# Patient Record
Sex: FEMALE | Race: WHITE | NOT HISPANIC OR LATINO | Employment: UNEMPLOYED | URBAN - METROPOLITAN AREA
[De-identification: names, ages, dates, MRNs, and addresses within clinical notes are randomized per-mention and may not be internally consistent; named-entity substitution may affect disease eponyms.]

---

## 2023-07-23 ENCOUNTER — APPOINTMENT (OUTPATIENT)
Dept: RADIOLOGY | Facility: IMAGING CENTER | Age: 48
End: 2023-07-23
Attending: PHYSICIAN ASSISTANT
Payer: MEDICAID

## 2023-07-23 ENCOUNTER — OFFICE VISIT (OUTPATIENT)
Dept: URGENT CARE | Facility: PHYSICIAN GROUP | Age: 48
End: 2023-07-23
Payer: MEDICAID

## 2023-07-23 VITALS
OXYGEN SATURATION: 96 % | HEART RATE: 68 BPM | WEIGHT: 253 LBS | BODY MASS INDEX: 38.34 KG/M2 | SYSTOLIC BLOOD PRESSURE: 112 MMHG | RESPIRATION RATE: 16 BRPM | TEMPERATURE: 97.7 F | HEIGHT: 68 IN | DIASTOLIC BLOOD PRESSURE: 80 MMHG

## 2023-07-23 DIAGNOSIS — R20.2 NUMBNESS AND TINGLING IN LEFT ARM: ICD-10-CM

## 2023-07-23 DIAGNOSIS — R20.0 NUMBNESS AND TINGLING IN LEFT ARM: ICD-10-CM

## 2023-07-23 DIAGNOSIS — M47.22 CERVICAL SPONDYLOSIS WITH RADICULOPATHY: ICD-10-CM

## 2023-07-23 PROCEDURE — 72040 X-RAY EXAM NECK SPINE 2-3 VW: CPT | Mod: TC,FY | Performed by: PHYSICIAN ASSISTANT

## 2023-07-23 PROCEDURE — 3074F SYST BP LT 130 MM HG: CPT | Performed by: PHYSICIAN ASSISTANT

## 2023-07-23 PROCEDURE — 3079F DIAST BP 80-89 MM HG: CPT | Performed by: PHYSICIAN ASSISTANT

## 2023-07-23 PROCEDURE — 99203 OFFICE O/P NEW LOW 30 MIN: CPT | Performed by: PHYSICIAN ASSISTANT

## 2023-07-23 RX ORDER — NICOTINE 21 MG/24HR
PATCH, TRANSDERMAL 24 HOURS TRANSDERMAL
COMMUNITY
Start: 2023-07-20 | End: 2024-03-11

## 2023-07-23 RX ORDER — HYDROXYZINE 50 MG/1
50 TABLET, FILM COATED ORAL 3 TIMES DAILY PRN
COMMUNITY
Start: 2023-07-19 | End: 2024-03-11

## 2023-07-23 RX ORDER — TRAZODONE HYDROCHLORIDE 100 MG/1
TABLET ORAL
COMMUNITY
Start: 2023-07-19

## 2023-07-23 RX ORDER — BUPROPION HYDROCHLORIDE 150 MG/1
TABLET, EXTENDED RELEASE ORAL
COMMUNITY
Start: 2023-07-19 | End: 2024-03-11

## 2023-07-23 RX ORDER — METHYLPREDNISOLONE 4 MG/1
4 TABLET ORAL DAILY
Qty: 21 TABLET | Refills: 0 | Status: SHIPPED | OUTPATIENT
Start: 2023-07-23 | End: 2024-03-11

## 2023-07-23 ASSESSMENT — ENCOUNTER SYMPTOMS
EYES NEGATIVE: 1
MUSCULOSKELETAL NEGATIVE: 1

## 2023-07-23 NOTE — PROGRESS NOTES
"  Subjective:     Sandra Garcia  is a 48 y.o. female who presents for Shoulder Pain ((L) x 1 month radiating down to L finger tips.  )       She presents today with left arm numbness and tingling that is been ongoing intermittently over the past month.  No trauma or injury to the shoulder noted.  Shoulder strength and range of motion does remain intact.  No cervical spine pain.  No loss of cervical spine movement.  No cervical spine injury.  Has been using over-the-counter medications for symptoms.  Does state that the radicular symptoms do from the shoulder all the way to the thumb and index finger       Review of Systems   HENT: Negative.     Eyes: Negative.    Musculoskeletal: Negative.    Neurological:         Numbness and tingling over the left arm      No Known Allergies  History reviewed. No pertinent past medical history.     Objective:   /80   Pulse 68   Temp 36.5 °C (97.7 °F) (Temporal)   Resp 16   Ht 1.727 m (5' 8\")   Wt 115 kg (253 lb)   SpO2 96%   BMI 38.47 kg/m²   Physical Exam  Vitals and nursing note reviewed.   Constitutional:       General: She is not in acute distress.     Appearance: She is not ill-appearing or toxic-appearing.   HENT:      Head: Normocephalic.      Nose: No rhinorrhea.   Eyes:      General: No scleral icterus.     Conjunctiva/sclera: Conjunctivae normal.   Pulmonary:      Effort: Pulmonary effort is normal. No respiratory distress.      Breath sounds: No stridor.   Musculoskeletal:      Cervical back: Neck supple.      Comments: Examination of the left upper extremity does show myotome and dermatome sensation remaining intact.  Manual muscle testing of the upper extremity muscles are comparable bilaterally.  No tenderness to palpation over the soft tissue or bony structures of the left shoulder or left shoulder girdle.  No muscular or bony tenderness of the cervical spine.  Cervical spine range of motion intact.   Neurological:      Mental Status: She is alert and " oriented to person, place, and time.   Psychiatric:         Mood and Affect: Mood normal.         Behavior: Behavior normal.         Thought Content: Thought content normal.         Judgment: Judgment normal.             Diagnostic testing:    Cervical spine x-ray series  I did personally review the Radiographic imaging my pression is that there is spondylosis at the C2-3 and C5-6, C6/7 levels.  Ventral bone spurring present at all levels of the cervical spine, loss of normal cervical lordosis.    Assessment/Plan:     Encounter Diagnoses   Name Primary?    Cervical spondylosis with radiculopathy     Numbness and tingling in left arm           Plan for care for today's complaint includes starting the patient on Medrol Dosepak for cervical radiculopathy secondary to spondylosis of the cervical spine at the C2-3 and C5-6, C6/7 levels.  Referral to spine surgery due to the degenerative changes seen on radiographic imaging today.  No evidence of myelopathy on exam today.  Myotome and dermatome testing were normal over the upper extremities.  Continue to monitor symptoms and return to urgent care or follow-up with primary care provider if symptoms remain ongoing.  Follow-up in the emergency department if symptoms become severe, ER precautions discussed in office today..  Prescription for Medrol Dosepak provided.    See AVS Instructions below for written guidance provided to patient on after-visit management and care in addition to our verbal discussion during the visit.    Please note that this dictation was created using voice recognition software. I have attempted to correct all errors, but there may be sound-alike, spelling, grammar and possibly content errors that I did not discover before finalizing the note.    Blade Rodríguez PA-C

## 2024-03-11 ENCOUNTER — OFFICE VISIT (OUTPATIENT)
Dept: URGENT CARE | Facility: PHYSICIAN GROUP | Age: 49
End: 2024-03-11
Payer: MEDICAID

## 2024-03-11 VITALS
BODY MASS INDEX: 28.79 KG/M2 | TEMPERATURE: 98.2 F | WEIGHT: 190 LBS | HEART RATE: 86 BPM | HEIGHT: 68 IN | OXYGEN SATURATION: 96 % | RESPIRATION RATE: 14 BRPM | SYSTOLIC BLOOD PRESSURE: 116 MMHG | DIASTOLIC BLOOD PRESSURE: 80 MMHG

## 2024-03-11 DIAGNOSIS — E16.2 LOW BLOOD SUGAR: ICD-10-CM

## 2024-03-11 LAB — GLUCOSE BLD-MCNC: 82 MG/DL (ref 65–99)

## 2024-03-11 PROCEDURE — 99213 OFFICE O/P EST LOW 20 MIN: CPT | Performed by: PHYSICIAN ASSISTANT

## 2024-03-11 PROCEDURE — 82962 GLUCOSE BLOOD TEST: CPT | Performed by: PHYSICIAN ASSISTANT

## 2024-03-11 PROCEDURE — 3074F SYST BP LT 130 MM HG: CPT | Performed by: PHYSICIAN ASSISTANT

## 2024-03-11 PROCEDURE — 3079F DIAST BP 80-89 MM HG: CPT | Performed by: PHYSICIAN ASSISTANT

## 2024-03-11 RX ORDER — BLOOD SUGAR DIAGNOSTIC
STRIP MISCELLANEOUS
Qty: 200 STRIP | Refills: 3 | Status: SHIPPED | OUTPATIENT
Start: 2024-03-11

## 2024-03-11 ASSESSMENT — ENCOUNTER SYMPTOMS
NAUSEA: 0
CONSTIPATION: 0
CARDIOVASCULAR NEGATIVE: 1
ABDOMINAL PAIN: 0
WEAKNESS: 1
MUSCULOSKELETAL NEGATIVE: 1
FEVER: 0
DIARRHEA: 0
VOMITING: 0
DIZZINESS: 1
RESPIRATORY NEGATIVE: 1
HEADACHES: 0

## 2024-03-11 NOTE — PROGRESS NOTES
Subjective     Sandra Garcia is a very pleasant 48 y.o. female who presents with Blood Sugar Problem (150 yesterday. Intermittent fasting  since Aug wondering if this could be a concern. ) and Hypertension (BP issues. X 2 days )            HPI  Patient here for glucose and blood pressure check.  She has several questions pertaining to both.  She has been performing intermittent fasting since 8/23.  She is lost over 60 pounds and is altogether feeling great.  Last night she was approximately 60 hours into a intermittent fasting around when she began to feel weak, dizzy and fatigued.  She checked her blood glucose and was found to be 50.  She did eat and feel much improved.  Today she feels normal but is wondering what the limits of low blood sugar numbers should be.  She denies chest pain, palpitations, shortness of breath, headache, dizziness, blurry vision, vomiting, leg swelling, calf tenderness or urinary symptoms.      PMH:  has no past medical history on file.  MEDS:   Current Outpatient Medications:     Glucose Blood (BLOOD GLUCOSE TEST STRIPS 333) Strip, Use as directed, Disp: 200 Strip, Rfl: 3    sertraline (ZOLOFT) 50 MG Tab, Take 50 mg by mouth every day., Disp: , Rfl:     traZODone (DESYREL) 100 MG Tab, TAKE 1 & 1/2 (ONE & ONE-HALF) TABLETS BY MOUTH EVERY DAY AT BEDTIME AS NEEDED FOR SLEEP, Disp: , Rfl:   ALLERGIES: No Known Allergies  SURGHX: No past surgical history on file.  SOCHX:  reports that she has been smoking cigarettes. She has never used smokeless tobacco. She reports that she does not drink alcohol and does not use drugs.  FH: family history is not on file.        Review of Systems   Constitutional:  Positive for malaise/fatigue. Negative for fever.   HENT: Negative.     Respiratory: Negative.     Cardiovascular: Negative.    Gastrointestinal:  Negative for abdominal pain, constipation, diarrhea, nausea and vomiting.   Genitourinary: Negative.    Musculoskeletal: Negative.    Neurological:   "Positive for dizziness and weakness. Negative for headaches.   All other systems reviewed and are negative.             Objective     /80   Pulse 86   Temp 36.8 °C (98.2 °F) (Temporal)   Resp 14   Ht 1.727 m (5' 8\")   Wt 86.2 kg (190 lb)   SpO2 96%   BMI 28.89 kg/m²      Physical Exam  Vitals and nursing note reviewed.   Constitutional:       General: She is not in acute distress.     Appearance: Normal appearance. She is well-developed. She is not ill-appearing, toxic-appearing or diaphoretic.   HENT:      Head: Normocephalic and atraumatic.      Right Ear: Hearing and external ear normal.      Left Ear: Hearing and external ear normal.      Nose: Nose normal.   Eyes:      General:         Right eye: No discharge.         Left eye: No discharge.      Conjunctiva/sclera: Conjunctivae normal.   Cardiovascular:      Rate and Rhythm: Normal rate and regular rhythm.      Pulses: Normal pulses.      Heart sounds: Normal heart sounds. No murmur heard.  Pulmonary:      Effort: Pulmonary effort is normal. No respiratory distress.      Breath sounds: Normal breath sounds. No stridor. No wheezing, rhonchi or rales.   Musculoskeletal:      Cervical back: Normal range of motion and neck supple.      Right lower leg: No edema.      Left lower leg: No edema.   Skin:     General: Skin is warm and dry.      Findings: No rash.   Neurological:      General: No focal deficit present.      Mental Status: She is alert and oriented to person, place, and time.   Psychiatric:         Mood and Affect: Mood normal.         Behavior: Behavior normal.         Thought Content: Thought content normal.         Judgment: Judgment normal.                             Assessment & Plan     This is a very pleasant 48-year-old female presenting for blood pressure and blood glucose check.  Patient has been completing intermittent fasting since 8/23 and has lost over 60 pounds.  She states overall she feels incredible with no significant " concerns.  However, last night she was towards the tail end of a 60-hour intermittent fast when she began to feel weak, lightheaded and fatigued.  She did check her blood sugar and it was found to be 50.  She ate and felt much improved.  Today she is feeling normal.  She denies chest pain, palpitations, blurry vision, shortness of breath, leg swelling, vomiting, etc.  No other pertinent past contributory medical history.  Today her vital signs are normal.  Her BP was checked twice and completely normal.  In clinic blood glucose well within normal limits.  No acute concerns or findings today in clinic.  I did have long discussion with patient that intermittent fasting is safe and appropriate however she needs to more closely monitor her blood glucose and when she is feeling symptomatic or noticing decreasing blood glucose level she needs to stop that round of fasting.  All questions and concerns answered.  ER and red flag symptoms discussed at length.      1. Low blood sugar  POCT Glucose    Glucose Blood (BLOOD GLUCOSE TEST STRIPS 333) Strip          I personally reviewed prior external notes and test results pertinent to today's visit. Return to clinic or go to ED if symptoms worsen or persist. Red flag symptoms and indications for ED discussed at length. Patient/Parent/Guardian voices understanding.  AVS with post-visit instructions provided or given verbally.  Follow-up with your primary care provider in 3-5 days. All side effects and potential interactions of prescribed medication discussed including allergic response, GI upset, tendon injury, rash, sedation, OCP effectiveness, etc.    Please note that this dictation was created using voice recognition software. I have made every reasonable attempt to correct obvious errors, but I expect that there are errors of grammar and possibly content that I did not discover before finalizing the note.

## 2024-03-12 DIAGNOSIS — E16.2 LOW BLOOD SUGAR: ICD-10-CM

## 2024-05-19 ENCOUNTER — OFFICE VISIT (OUTPATIENT)
Dept: URGENT CARE | Facility: PHYSICIAN GROUP | Age: 49
End: 2024-05-19
Payer: MEDICAID

## 2024-05-19 VITALS
SYSTOLIC BLOOD PRESSURE: 120 MMHG | DIASTOLIC BLOOD PRESSURE: 82 MMHG | HEART RATE: 75 BPM | OXYGEN SATURATION: 97 % | RESPIRATION RATE: 18 BRPM | BODY MASS INDEX: 29.18 KG/M2 | HEIGHT: 69 IN | TEMPERATURE: 98.1 F | WEIGHT: 197 LBS

## 2024-05-19 DIAGNOSIS — S86.912A STRAIN OF LEFT KNEE, INITIAL ENCOUNTER: ICD-10-CM

## 2024-05-19 PROCEDURE — 99214 OFFICE O/P EST MOD 30 MIN: CPT | Performed by: PHYSICIAN ASSISTANT

## 2024-05-19 PROCEDURE — 3079F DIAST BP 80-89 MM HG: CPT | Performed by: PHYSICIAN ASSISTANT

## 2024-05-19 PROCEDURE — 3074F SYST BP LT 130 MM HG: CPT | Performed by: PHYSICIAN ASSISTANT

## 2024-05-19 RX ORDER — KETOROLAC TROMETHAMINE 30 MG/ML
30 INJECTION, SOLUTION INTRAMUSCULAR; INTRAVENOUS ONCE
Status: COMPLETED | OUTPATIENT
Start: 2024-05-19 | End: 2024-05-19

## 2024-05-19 RX ORDER — MELOXICAM 15 MG/1
15 TABLET ORAL DAILY
Qty: 30 TABLET | Refills: 1 | Status: SHIPPED | OUTPATIENT
Start: 2024-05-19

## 2024-05-19 RX ADMIN — KETOROLAC TROMETHAMINE 30 MG: 30 INJECTION, SOLUTION INTRAMUSCULAR; INTRAVENOUS at 15:34

## 2024-05-19 ASSESSMENT — ENCOUNTER SYMPTOMS
WEAKNESS: 0
VOMITING: 0
FEVER: 0
JOINT SWELLING: 1
FATIGUE: 0
NUMBNESS: 0
ABDOMINAL PAIN: 0

## 2024-05-19 NOTE — PROGRESS NOTES
"Subjective     Sandra Garcia is a very pleasant 49 y.o. female who presents with Knee Pain            Knee Pain  This is a recurrent problem. The current episode started 1 to 4 weeks ago. The problem occurs constantly. The problem has been unchanged. Associated symptoms include joint swelling. Pertinent negatives include no abdominal pain, fatigue, fever, numbness, rash, vomiting or weakness. The symptoms are aggravated by walking and standing. She has tried acetaminophen and NSAIDs for the symptoms. The treatment provided mild relief.       PMH:  has no past medical history on file.  MEDS:   Current Outpatient Medications:     Glucose Blood (BLOOD GLUCOSE TEST STRIPS 333) Strip, Use as directed, Disp: 200 Strip, Rfl: 3    sertraline (ZOLOFT) 50 MG Tab, Take 50 mg by mouth every day., Disp: , Rfl:     traZODone (DESYREL) 100 MG Tab, TAKE 1 & 1/2 (ONE & ONE-HALF) TABLETS BY MOUTH EVERY DAY AT BEDTIME AS NEEDED FOR SLEEP, Disp: , Rfl:   ALLERGIES: No Known Allergies  SURGHX: No past surgical history on file.  SOCHX:  reports that she has been smoking cigarettes. She has never used smokeless tobacco. She reports that she does not drink alcohol and does not use drugs.  FH: family history is not on file.      Review of Systems   Constitutional:  Negative for fatigue and fever.   Gastrointestinal:  Negative for abdominal pain and vomiting.   Musculoskeletal:  Positive for joint swelling.   Skin:  Negative for rash.   Neurological:  Negative for weakness and numbness.              Objective     /82 (BP Location: Left arm, Patient Position: Sitting, BP Cuff Size: Adult)   Pulse 75   Temp 36.7 °C (98.1 °F)   Resp 18   Ht 1.753 m (5' 9\")   Wt 89.4 kg (197 lb)   SpO2 97%   BMI 29.09 kg/m²      Physical Exam  Vitals and nursing note reviewed.   Constitutional:       General: She is not in acute distress.     Appearance: Normal appearance. She is well-developed. She is not ill-appearing, toxic-appearing or " diaphoretic.   HENT:      Head: Normocephalic and atraumatic.      Right Ear: Hearing and external ear normal.      Left Ear: Hearing and external ear normal.      Nose: Nose normal.   Eyes:      General:         Right eye: No discharge.         Left eye: No discharge.      Conjunctiva/sclera: Conjunctivae normal.   Cardiovascular:      Rate and Rhythm: Normal rate and regular rhythm.      Heart sounds: Normal heart sounds.   Pulmonary:      Effort: Pulmonary effort is normal. No respiratory distress.      Breath sounds: Normal breath sounds. No wheezing.   Musculoskeletal:      Cervical back: Normal range of motion and neck supple.      Right knee: Normal.      Left knee: Swelling and crepitus present. No effusion, ecchymosis or bony tenderness. Normal range of motion. Tenderness present.   Skin:     General: Skin is warm and dry.   Neurological:      General: No focal deficit present.      Mental Status: She is alert and oriented to person, place, and time.   Psychiatric:         Mood and Affect: Mood normal.         Behavior: Behavior normal.         Thought Content: Thought content normal.         Judgment: Judgment normal.                             Assessment & Plan        1. Strain of left knee, initial encounter  ketorolac (Toradol) injection 30 mg    meloxicam (MOBIC) 15 MG tablet        Acute on chronic flare of her osteoarthritis.  Swelling tenderness and decreased range of motion.  No new injury or traumatic event.  Toradol given in clinic.  RICE therapy, OTC meds.  If no improvement referral to specialty.      I personally reviewed prior external notes and test results pertinent to today's visit. Return to clinic or go to ED if symptoms worsen or persist. Red flag symptoms and indications for ED discussed at length. Patient/Parent/Guardian voices understanding.  AVS with post-visit instructions printed and provided or given verbally.  Follow-up with your primary care provider in 3-5 days. All side  effects and potential interactions of prescribed medication discussed including allergic response, GI upset, tendon injury, rash, sedation, OCP effectiveness, etc.    Please note that this dictation was created using voice recognition software. I have made every reasonable attempt to correct obvious errors, but I expect that there are errors of grammar and possibly content that I did not discover before finalizing the note.

## 2024-06-05 ENCOUNTER — OFFICE VISIT (OUTPATIENT)
Dept: URGENT CARE | Facility: PHYSICIAN GROUP | Age: 49
End: 2024-06-05
Payer: MEDICAID

## 2024-06-05 ENCOUNTER — HOSPITAL ENCOUNTER (OUTPATIENT)
Facility: MEDICAL CENTER | Age: 49
End: 2024-06-05
Attending: NURSE PRACTITIONER
Payer: MEDICAID

## 2024-06-05 VITALS
HEART RATE: 73 BPM | WEIGHT: 195 LBS | DIASTOLIC BLOOD PRESSURE: 70 MMHG | RESPIRATION RATE: 16 BRPM | TEMPERATURE: 97 F | SYSTOLIC BLOOD PRESSURE: 132 MMHG | HEIGHT: 69 IN | BODY MASS INDEX: 28.88 KG/M2 | OXYGEN SATURATION: 96 %

## 2024-06-05 DIAGNOSIS — S29.012A STRAIN OF MID-BACK, INITIAL ENCOUNTER: Primary | ICD-10-CM

## 2024-06-05 DIAGNOSIS — R30.0 DYSURIA: ICD-10-CM

## 2024-06-05 DIAGNOSIS — N30.01 ACUTE CYSTITIS WITH HEMATURIA: ICD-10-CM

## 2024-06-05 DIAGNOSIS — R35.0 URINARY FREQUENCY: ICD-10-CM

## 2024-06-05 DIAGNOSIS — R39.15 URINARY URGENCY: ICD-10-CM

## 2024-06-05 LAB
APPEARANCE UR: NORMAL
BILIRUB UR STRIP-MCNC: NORMAL MG/DL
COLOR UR AUTO: NORMAL
GLUCOSE UR STRIP.AUTO-MCNC: NORMAL MG/DL
KETONES UR STRIP.AUTO-MCNC: NORMAL MG/DL
LEUKOCYTE ESTERASE UR QL STRIP.AUTO: NORMAL
NITRITE UR QL STRIP.AUTO: NORMAL
PH UR STRIP.AUTO: 5.5 [PH] (ref 5–8)
PROT UR QL STRIP: NORMAL MG/DL
RBC UR QL AUTO: NORMAL
SP GR UR STRIP.AUTO: >=1.03
UROBILINOGEN UR STRIP-MCNC: 0.2 MG/DL

## 2024-06-05 PROCEDURE — 87086 URINE CULTURE/COLONY COUNT: CPT

## 2024-06-05 PROCEDURE — 81002 URINALYSIS NONAUTO W/O SCOPE: CPT | Performed by: NURSE PRACTITIONER

## 2024-06-05 PROCEDURE — 3075F SYST BP GE 130 - 139MM HG: CPT | Performed by: NURSE PRACTITIONER

## 2024-06-05 PROCEDURE — 87077 CULTURE AEROBIC IDENTIFY: CPT

## 2024-06-05 PROCEDURE — 87186 SC STD MICRODIL/AGAR DIL: CPT

## 2024-06-05 PROCEDURE — 99213 OFFICE O/P EST LOW 20 MIN: CPT | Mod: 25 | Performed by: NURSE PRACTITIONER

## 2024-06-05 PROCEDURE — 3078F DIAST BP <80 MM HG: CPT | Performed by: NURSE PRACTITIONER

## 2024-06-05 RX ORDER — BUPROPION HYDROCHLORIDE 150 MG/1
TABLET, EXTENDED RELEASE ORAL
COMMUNITY
Start: 2024-04-16

## 2024-06-05 RX ORDER — NITROFURANTOIN 25; 75 MG/1; MG/1
100 CAPSULE ORAL EVERY 12 HOURS
Qty: 10 CAPSULE | Refills: 0 | Status: SHIPPED | OUTPATIENT
Start: 2024-06-05 | End: 2024-06-10

## 2024-06-05 RX ORDER — METHOCARBAMOL 500 MG/1
1000 TABLET, FILM COATED ORAL 4 TIMES DAILY
Qty: 120 TABLET | Refills: 0 | Status: SHIPPED | OUTPATIENT
Start: 2024-06-05 | End: 2024-06-15

## 2024-06-05 RX ORDER — HYDROXYZINE 50 MG/1
50 TABLET, FILM COATED ORAL 3 TIMES DAILY PRN
COMMUNITY
Start: 2024-04-16

## 2024-06-05 NOTE — PROGRESS NOTES
"Subjective:     Sandra Garcia is a 49 y.o. female who presents for Flank Pain (Bilateral flank pain x 1 wk.  )      HPI  Pt presents for evaluation of a new problem.  This is a very pleasant 49-year-old female presents to urgent care today with complaints of mid back pain bilaterally and UTI symptoms.  She endorses dysuria, urgency and frequency.  Negative for fever, chills, nausea or vomiting.  Her symptoms have been ongoing for the past 2 weeks.  She is concerned as her sister was diagnosed with bladder cancer and states that her symptoms began with back pain.  She has not use any medication for her symptoms.    ROS    PMH: No past medical history on file.  ALLERGIES: No Known Allergies  SURGHX: No past surgical history on file.  SOCHX:   Social History     Socioeconomic History    Marital status:    Tobacco Use    Smoking status: Every Day     Types: Cigarettes    Smokeless tobacco: Never   Vaping Use    Vaping status: Never Used   Substance and Sexual Activity    Alcohol use: Never    Drug use: Never     FH: No family history on file.      Objective:   /70   Pulse 73   Temp 36.1 °C (97 °F) (Temporal)   Resp 16   Ht 1.753 m (5' 9\")   Wt 88.5 kg (195 lb)   SpO2 96%   BMI 28.80 kg/m²     Physical Exam  Vitals and nursing note reviewed.   Constitutional:       General: She is not in acute distress.     Appearance: Normal appearance. She is normal weight. She is not ill-appearing or toxic-appearing.   HENT:      Head: Normocephalic.      Right Ear: Tympanic membrane, ear canal and external ear normal.      Left Ear: Tympanic membrane, ear canal and external ear normal.      Nose: No congestion or rhinorrhea.      Mouth/Throat:      Pharynx: No oropharyngeal exudate or posterior oropharyngeal erythema.   Eyes:      General:         Right eye: No discharge.         Left eye: No discharge.      Pupils: Pupils are equal, round, and reactive to light.   Cardiovascular:      Rate and Rhythm: Normal rate " and regular rhythm.      Pulses: Normal pulses.      Heart sounds: Normal heart sounds.   Pulmonary:      Effort: Pulmonary effort is normal.   Abdominal:      General: Abdomen is flat. There is no distension.      Tenderness: There is no abdominal tenderness. There is no guarding.   Musculoskeletal:         General: Normal range of motion.      Cervical back: Normal range of motion and neck supple.      Thoracic back: Tenderness present. No bony tenderness.        Back:    Skin:     General: Skin is dry.   Neurological:      General: No focal deficit present.      Mental Status: She is alert and oriented to person, place, and time. Mental status is at baseline.   Psychiatric:         Mood and Affect: Mood normal.         Behavior: Behavior normal.         Thought Content: Thought content normal.         Judgment: Judgment normal.         Assessment/Plan:   Assessment        1. Strain of mid-back, initial encounter  methocarbamol (ROBAXIN) 500 MG Tab      2. Acute cystitis with hematuria  Urine Culture    nitrofurantoin (MACROBID) 100 MG Cap      3. Dysuria  POCT Urinalysis      4. Urinary frequency  POCT Urinalysis      5. Urinary urgency  POCT Urinalysis        Differential diagnoses discussed with patient.  Urine culture sent for evaluation of infection.  Her  urinalysis is suspicious for UTI.  She was empirically started on nitrofurantoin for treatment of infection.  She was also given prescription for methocarbamol for relief of back pain and spasming.  We did discuss use of ibuprofen, Tylenol, rest, ice and heat.  Patient to return for worsening or persistent symptoms.  She is in agreement with plan of care.

## 2024-06-06 DIAGNOSIS — N30.01 ACUTE CYSTITIS WITH HEMATURIA: ICD-10-CM

## 2024-06-08 LAB
BACTERIA UR CULT: ABNORMAL
BACTERIA UR CULT: ABNORMAL
SIGNIFICANT IND 70042: ABNORMAL
SITE SITE: ABNORMAL
SOURCE SOURCE: ABNORMAL

## 2024-06-10 RX ORDER — BLOOD SUGAR DIAGNOSTIC
STRIP MISCELLANEOUS
Refills: 3 | OUTPATIENT
Start: 2024-06-10

## 2024-06-11 ENCOUNTER — OFFICE VISIT (OUTPATIENT)
Dept: URGENT CARE | Facility: PHYSICIAN GROUP | Age: 49
End: 2024-06-11
Payer: MEDICAID

## 2024-06-11 VITALS
HEIGHT: 69 IN | BODY MASS INDEX: 28.29 KG/M2 | TEMPERATURE: 98.8 F | OXYGEN SATURATION: 98 % | RESPIRATION RATE: 14 BRPM | HEART RATE: 74 BPM | SYSTOLIC BLOOD PRESSURE: 128 MMHG | WEIGHT: 191 LBS | DIASTOLIC BLOOD PRESSURE: 82 MMHG

## 2024-06-11 DIAGNOSIS — M50.30 DDD (DEGENERATIVE DISC DISEASE), CERVICAL: ICD-10-CM

## 2024-06-11 DIAGNOSIS — M54.12 CERVICAL RADICULOPATHY: ICD-10-CM

## 2024-06-11 PROCEDURE — 3079F DIAST BP 80-89 MM HG: CPT | Performed by: FAMILY MEDICINE

## 2024-06-11 PROCEDURE — 99213 OFFICE O/P EST LOW 20 MIN: CPT | Performed by: FAMILY MEDICINE

## 2024-06-11 PROCEDURE — 3074F SYST BP LT 130 MM HG: CPT | Performed by: FAMILY MEDICINE

## 2024-06-11 ASSESSMENT — ENCOUNTER SYMPTOMS
MYALGIAS: 1
BACK PAIN: 1
NECK PAIN: 1

## 2024-06-12 NOTE — PROGRESS NOTES
Subjective     Sandra Garcia is a 49 y.o. female who presents with Back Pain (X 1 month, mid back area) and Neck Pain (X 1 month)    - This is a very pleasant 49 y.o. who has come to the walk-in clinic today for neck and upper mid back pain acting up again for about a month.  Says has had this before and saw a back specialist in [place] but cannot recall the clinics name or their phone number and says they offered her an injection and they did an MRI but declined the injection at the time but wants to go back to see them.  Started a [employer] serving job in the past couple months and this has been aggravating her neck again.  Has a little bit of radiation into the left shoulder.  No limb weakness numbness heaviness.          ALLERGIES:  Patient has no known allergies.     PMH:  History reviewed. No pertinent past medical history.     PSH:  History reviewed. No pertinent surgical history.    MEDS:    Current Outpatient Medications:     buPROPion SR (WELLBUTRIN-SR) 150 MG TABLET SR 12 HR sustained-release tablet, TAKE 1 TABLET BY MOUTH TWICE DAILY FOR MOOD AND SMOKING CESSATION., Disp: , Rfl:     hydrOXYzine HCl (ATARAX) 50 MG Tab, Take 50 mg by mouth 3 times a day as needed., Disp: , Rfl:     methocarbamol (ROBAXIN) 500 MG Tab, Take 2 Tablets by mouth 4 times a day for 10 days., Disp: 120 Tablet, Rfl: 0    Glucose Blood (BLOOD GLUCOSE TEST STRIPS 333) Strip, Use as directed, Disp: 200 Strip, Rfl: 3    sertraline (ZOLOFT) 50 MG Tab, Take 50 mg by mouth every day., Disp: , Rfl:     traZODone (DESYREL) 100 MG Tab, TAKE 1 & 1/2 (ONE & ONE-HALF) TABLETS BY MOUTH EVERY DAY AT BEDTIME AS NEEDED FOR SLEEP, Disp: , Rfl:     ** I have documented what I find to be significant in regards to past medical, social, family and surgical history  in my HPI or under PMH/PSH/FH review section, otherwise it is noncontributory **           HPI    Review of Systems   Musculoskeletal:  Positive for back pain, myalgias and neck pain.   All  "other systems reviewed and are negative.             Objective     /82   Pulse 74   Temp 37.1 °C (98.8 °F) (Temporal)   Resp 14   Ht 1.753 m (5' 9\")   Wt 86.6 kg (191 lb)   SpO2 98%   BMI 28.21 kg/m²      Physical Exam  Vitals and nursing note reviewed.   Constitutional:       General: She is not in acute distress.     Appearance: Normal appearance. She is well-developed.   HENT:      Head: Normocephalic.   Cardiovascular:      Rate and Rhythm: Normal rate and regular rhythm.   Pulmonary:      Effort: Pulmonary effort is normal. No respiratory distress.      Breath sounds: Normal breath sounds.   Musculoskeletal:        Arms:       Comments: Neck with mild tenderness to palpation paraspinal region and paraspinal thoracic region.  Good strength range of motion of the upper extremity and hand.   Neurological:      Mental Status: She is alert.      Motor: No abnormal muscle tone.   Psychiatric:         Mood and Affect: Mood normal.         Behavior: Behavior normal.                             Assessment & Plan     1. DDD (degenerative disc disease), cervical  Referral to Spine Surgery      2. Cervical radiculopathy  Referral to Spine Surgery          - Dx, plan & d/c instructions discussed   - Rest, stay hydrated, OTC Tylenol as needed      Follow up with your regular primary care providers office within a week to keep them updated and informed of this visit and for regular routine health maintenance check-ups. ER if not improving in 2-3 days or if feeling/getting worse. (If you do not have a primary care provider and need to schedule one you may call [facility] at [phone] to do this).    Patient left in stable condition     Discussed if any testing, labs or imaging studies are obtained outside of the [facility] facility, it is their responsibility to contact the Urgent Care and let us know that it was done and get us the results so adequate follow up can be initiated    Pertinent prior lab work and/or " imaging studies in Epic have been reviewed by me today on day of this visit and taken into account for my treatment and plan today    Pertinent PMH/PSH and/or chronic conditions and medications if any were reviewed today and taken into account for my treatment and plan today    Pertinent prior office visit notes in Epic have been reviewed by me today on day of this visit.    Please note that this dictation may have been created using voice recognition software, if so I have made every reasonable attempt to correct obvious errors, but I expect that there are errors of grammar and possibly content that I did not discover before finalizing the note.

## 2024-06-28 ENCOUNTER — OFFICE VISIT (OUTPATIENT)
Dept: URGENT CARE | Facility: PHYSICIAN GROUP | Age: 49
End: 2024-06-28
Payer: MEDICAID

## 2024-06-28 VITALS
RESPIRATION RATE: 16 BRPM | HEART RATE: 71 BPM | OXYGEN SATURATION: 97 % | HEIGHT: 69 IN | TEMPERATURE: 97.9 F | DIASTOLIC BLOOD PRESSURE: 78 MMHG | SYSTOLIC BLOOD PRESSURE: 120 MMHG | WEIGHT: 189.6 LBS | BODY MASS INDEX: 28.08 KG/M2

## 2024-06-28 DIAGNOSIS — R11.2 NAUSEA AND VOMITING, UNSPECIFIED VOMITING TYPE: ICD-10-CM

## 2024-06-28 RX ORDER — ONDANSETRON 4 MG/1
4 TABLET, ORALLY DISINTEGRATING ORAL ONCE
Status: COMPLETED | OUTPATIENT
Start: 2024-06-28 | End: 2024-06-28

## 2024-06-28 RX ORDER — ONDANSETRON 4 MG/1
4 TABLET, ORALLY DISINTEGRATING ORAL EVERY 6 HOURS PRN
Qty: 15 TABLET | Refills: 0 | Status: SHIPPED | OUTPATIENT
Start: 2024-06-28

## 2024-06-28 RX ADMIN — ONDANSETRON 4 MG: 4 TABLET, ORALLY DISINTEGRATING ORAL at 10:35

## 2024-06-28 ASSESSMENT — ENCOUNTER SYMPTOMS
ABDOMINAL PAIN: 1
CONSTIPATION: 0
SHORTNESS OF BREATH: 0
FEVER: 0
VOMITING: 1
COUGH: 0
CHILLS: 0
SORE THROAT: 0
NUMBER OF EPISODES OF EMESIS TODAY: 1
DIARRHEA: 0
NAUSEA: 1
HEADACHES: 0
MYALGIAS: 0

## 2024-06-28 NOTE — LETTER
[place]  [facility] [address] [address]     June 28, 2024    Patient: Sandra Garcia   YOB: 1975   Date of Visit: 6/28/2024       To Whom It May Concern:    Sandra Garcia was seen and treated in our department on 6/28/2024.  Please excuse patient for recent illness from today until Sunday, 6/30/2024.  Patient able to return on Monday.    Sincerely,     JA Francisco.

## 2024-06-28 NOTE — PROGRESS NOTES
"Subjective:   Sandra Garcia is a 49 y.o. female who presents for Emesis and Abdominal Pain (Seen at [facility] 6/26/24, symptoms have not improved )      Emesis  This is a new problem. Episode onset: 6/26/24. The problem has been gradually worsening. Associated symptoms include abdominal pain (mid-epigastric), nausea and vomiting. Pertinent negatives include no chest pain, chills, congestion, coughing, fever, headaches, myalgias, rash or sore throat. The symptoms are aggravated by eating and drinking. Treatments tried: Seen in emergency department and was provided Zofran with relief patient was not given prescription at that time. The treatment provided mild relief.       Review of Systems   Constitutional:  Negative for chills, fever and malaise/fatigue.   HENT:  Negative for congestion, ear pain, hearing loss and sore throat.    Respiratory:  Negative for cough and shortness of breath.    Cardiovascular:  Negative for chest pain.   Gastrointestinal:  Positive for abdominal pain (mid-epigastric), nausea and vomiting. Negative for constipation and diarrhea.   Genitourinary:  Negative for dysuria.   Musculoskeletal:  Negative for myalgias.   Skin:  Negative for rash.   Neurological:  Negative for headaches.       Medications, Allergies, and current problem list reviewed today in Epic.     Objective:     /78   Pulse 71   Temp 36.6 °C (97.9 °F) (Temporal)   Resp 16   Ht 1.753 m (5' 9\")   Wt 86 kg (189 lb 9.6 oz)   SpO2 97%     Physical Exam  Vitals and nursing note reviewed.   Constitutional:       General: She is not in acute distress.     Appearance: Normal appearance. She is not ill-appearing.   HENT:      Head: Normocephalic and atraumatic.      Right Ear: Tympanic membrane normal.      Left Ear: Tympanic membrane normal.      Nose: Nose normal.      Mouth/Throat:      Mouth: Mucous membranes are moist.   Eyes:      Conjunctiva/sclera: Conjunctivae normal.   Cardiovascular:      Rate and Rhythm: Normal " rate.      Heart sounds: Normal heart sounds.   Pulmonary:      Effort: Pulmonary effort is normal.   Abdominal:      General: Abdomen is flat. There is no distension.      Palpations: Abdomen is soft.      Tenderness: There is no abdominal tenderness. There is no right CVA tenderness, left CVA tenderness or guarding.   Musculoskeletal:         General: Normal range of motion.      Cervical back: Normal range of motion.   Skin:     General: Skin is warm and dry.      Capillary Refill: Capillary refill takes less than 2 seconds.   Neurological:      Mental Status: She is alert and oriented to person, place, and time.   Psychiatric:         Mood and Affect: Mood normal.         Behavior: Behavior normal.         Assessment/Plan:       1. Nausea and vomiting, unspecified vomiting type  ondansetron (Zofran ODT) dispertab 4 mg    ondansetron (ZOFRAN ODT) 4 MG TABLET DISPERSIBLE        After ROS and physical exam patient here with complaints of continued nausea vomiting with mild epigastric pain after being seen in the emergency department.  Patient reports that she was provided IV Zofran and fluids with relief of symptoms and ER and workup was negative at that time.  Patient was not provided any prescription medications for home and soon after developed recurrent symptoms.  Patient reports that she has had symptoms for the past 2 or 3 months on and off after she has lost a significant amount of weight roughly 70 pounds in 7 months.  Weight loss was intentional.  Patient denies any fevers.  Patient does not have any noted blood in vomit.  Patient denies any diarrhea at this time.  After assessment patient had no significant abdominal tenderness but does report mild discomfort to mid epigastric region that she relates to episodes of vomiting.  Patient is in no acute distress and does not have any other significant findings on examination.  Patient was provided 4 mg of oral Zofran in office and given prescription for  continued Zofran at home.  Patient instructed to take as prescribed.  Patient was also instructed to try over-the-counter Pepto for small for symptom management.  Patient to focus on fluids and electrolyte intake at this time.  Patient to advance diet as tolerated and follow BRAT diet.  Patient instructed to monitor for any worsening signs and symptoms and if no improvement to return to urgent care or emergency department for further management.    Differential diagnosis, natural history, and supportive care discussed. We also reviewed side effects of medication including allergic response, GI upset, tendon injury, rash, sedation etc. Patient and/or guardian voices understanding.      Advised the patient to follow-up with the primary care physician for recheck, reevaluation, and consideration of further management.    I personally reviewed prior external notes and test results pertinent to today's visit as well as additional imaging and testing completed in clinic today.     Please note that this dictation was created using voice recognition software. I have made every reasonable attempt to correct obvious errors, but I expect that there are errors of grammar and possibly content that I did not discover before finalizing the note.    This note was electronically signed by DIANE Edmonds

## 2024-07-24 ENCOUNTER — NON-PROVIDER VISIT (OUTPATIENT)
Dept: URGENT CARE | Facility: PHYSICIAN GROUP | Age: 49
End: 2024-07-24

## 2024-07-24 DIAGNOSIS — Z02.1 PRE-EMPLOYMENT DRUG SCREENING: ICD-10-CM

## 2024-07-24 LAB
AMP AMPHETAMINE: NORMAL
COC COCAINE: NORMAL
INT CON NEG: NORMAL
INT CON POS: NORMAL
MET METHAMPHETAMINES: NORMAL
OPI OPIATES: NORMAL
PCP PHENCYCLIDINE: NORMAL
POC DRUG COMMENT 753798-OCCUPATIONAL HEALTH: NORMAL
THC: NORMAL

## 2024-07-24 PROCEDURE — 80305 DRUG TEST PRSMV DIR OPT OBS: CPT

## 2025-01-20 ENCOUNTER — APPOINTMENT (OUTPATIENT)
Dept: URGENT CARE | Facility: PHYSICIAN GROUP | Age: 50
End: 2025-01-20
Payer: OTHER MISCELLANEOUS